# Patient Record
Sex: MALE | Race: BLACK OR AFRICAN AMERICAN | NOT HISPANIC OR LATINO | URBAN - METROPOLITAN AREA
[De-identification: names, ages, dates, MRNs, and addresses within clinical notes are randomized per-mention and may not be internally consistent; named-entity substitution may affect disease eponyms.]

---

## 2020-03-04 ENCOUNTER — EMERGENCY (EMERGENCY)
Facility: HOSPITAL | Age: 30
LOS: 1 days | Discharge: LEFT BEFORE DISCHARGE | End: 2020-03-04
Attending: EMERGENCY MEDICINE
Payer: SELF-PAY

## 2020-03-04 VITALS
TEMPERATURE: 98 F | OXYGEN SATURATION: 98 % | SYSTOLIC BLOOD PRESSURE: 113 MMHG | RESPIRATION RATE: 18 BRPM | HEART RATE: 87 BPM | WEIGHT: 160.06 LBS | HEIGHT: 71 IN | DIASTOLIC BLOOD PRESSURE: 70 MMHG

## 2020-03-04 DIAGNOSIS — Z90.49 ACQUIRED ABSENCE OF OTHER SPECIFIED PARTS OF DIGESTIVE TRACT: Chronic | ICD-10-CM

## 2020-03-04 PROCEDURE — 99284 EMERGENCY DEPT VISIT MOD MDM: CPT

## 2020-03-04 PROCEDURE — 99283 EMERGENCY DEPT VISIT LOW MDM: CPT

## 2020-03-04 RX ORDER — IBUPROFEN 200 MG
1 TABLET ORAL
Qty: 15 | Refills: 0
Start: 2020-03-04

## 2020-03-04 RX ORDER — IBUPROFEN 200 MG
600 TABLET ORAL ONCE
Refills: 0 | Status: COMPLETED | OUTPATIENT
Start: 2020-03-04 | End: 2020-03-04

## 2020-03-04 RX ADMIN — Medication 600 MILLIGRAM(S): at 21:32

## 2020-03-04 RX ADMIN — Medication 1 TABLET(S): at 21:32

## 2020-03-04 NOTE — ED PROVIDER NOTE - CARE PROVIDER_API CALL
Chun Garg)  Otolaryngology  [address] [place] [address]  Phone: [phone]  Fax: [phone]  Follow Up Time: 1-3 Days

## 2020-03-04 NOTE — ED PROVIDER NOTE - ENMT, MLM
Airway patent, Nasal mucosa clear. Mouth with normal mucosa. Throat with mild erythema  has no vesicles, no oropharyngeal exudates and uvula is midline.   Right TM bulging mild erythema , no d.c or external ear TTP   left TM WNl , mastoic TTP b/L , no cervical lymphadenopathy

## 2020-03-04 NOTE — ED ADULT TRIAGE NOTE - CHIEF COMPLAINT QUOTE
pt states waas cleaning his ears, now c/o muffles sounds cant hear anything, both ears  A&Ox3, resp wnl, NAD

## 2020-03-04 NOTE — ED PROVIDER NOTE - PROGRESS NOTE DETAILS
pt is walked out the ER , can not find the PT's  made RN aware   called the pt x 2 in chart not picking up the phone

## 2020-03-04 NOTE — ED PROVIDER NOTE - CLINICAL SUMMARY MEDICAL DECISION MAKING FREE TEXT BOX
30 y/O male smoker With subjective fever at home and ear pain B/L and cough and sore throat R.O otitis media   medrol pack , Augmentin - ibuprofen  f,u ENt

## 2020-03-04 NOTE — ED PROVIDER NOTE - PATIENT PORTAL LINK FT
You can access the FollowMyHealth Patient Portal offered by [facility] by registering at the following website: [email or web address]. By joining [employer]’s FollowMyHealth portal, you will also be able to view your health information using other applications (apps) compatible with our system.

## 2020-03-04 NOTE — ED PROVIDER NOTE - OBJECTIVE STATEMENT
30 y/O male States no Sig PMh presents in Er and  c.O  b/l ear pain and clogging x 2-3 days and fever and cough after he was on the bath went to the under water . states right ear clogging was more than left . then he used the OTC drop that the clogging on the left ear is gone he still has clogging and ear on the right ear . states his throat is itchy . he is coughing x 1 week with phlegm. denies any chest pain , abdominal pain  states he took tylenol 1 days ago

## 2020-03-04 NOTE — ED PROVIDER NOTE - ATTENDING CONTRIBUTION TO CARE
I, Cory Pickering, performed a face to face bedside interview with this patient regarding history of present illness, review of symptoms and relevant past medical, social and family history.  I completed an independent physical examination. I have communicated the patient’s plan of care and disposition with the ACP.      28 yo male no pmh with difficulty hearing out of both ears ; denies fever chills , nausea or vomiting; pe awake alert rt ear; tm red , bulging; left cerumen; mouth no exudate; chest clear abd soft; neuro nonfocal;  dx om; abx

## 2020-03-04 NOTE — ED PROVIDER NOTE - NSFOLLOWUPINSTRUCTIONS_ED_ALL_ED_FT
please do not use Q tip or direct water in ear   take medications with food   call and follow up with ENt specialist   come back in ER if the pain get worse , ear bleeding or discharge, headache or dizziness or any new concern